# Patient Record
Sex: MALE | Race: WHITE
[De-identification: names, ages, dates, MRNs, and addresses within clinical notes are randomized per-mention and may not be internally consistent; named-entity substitution may affect disease eponyms.]

---

## 2020-06-01 ENCOUNTER — HOSPITAL ENCOUNTER (INPATIENT)
Dept: HOSPITAL 84 - D.M2 | Age: 75
LOS: 3 days | Discharge: HOME HEALTH | DRG: 300 | End: 2020-06-04
Attending: SURGERY | Admitting: SURGERY
Payer: MEDICARE

## 2020-06-01 VITALS — SYSTOLIC BLOOD PRESSURE: 145 MMHG | DIASTOLIC BLOOD PRESSURE: 86 MMHG

## 2020-06-01 VITALS — SYSTOLIC BLOOD PRESSURE: 150 MMHG | DIASTOLIC BLOOD PRESSURE: 77 MMHG

## 2020-06-01 VITALS — DIASTOLIC BLOOD PRESSURE: 69 MMHG | SYSTOLIC BLOOD PRESSURE: 127 MMHG

## 2020-06-01 DIAGNOSIS — I83.893: ICD-10-CM

## 2020-06-01 DIAGNOSIS — L03.116: ICD-10-CM

## 2020-06-01 DIAGNOSIS — I87.032: ICD-10-CM

## 2020-06-01 DIAGNOSIS — L03.115: ICD-10-CM

## 2020-06-01 DIAGNOSIS — I83.813: ICD-10-CM

## 2020-06-01 DIAGNOSIS — I87.021: Primary | ICD-10-CM

## 2020-06-01 LAB
ALBUMIN SERPL-MCNC: 3.6 G/DL (ref 3.4–5)
ALP SERPL-CCNC: 44 U/L (ref 30–120)
ALT SERPL-CCNC: 29 U/L (ref 10–68)
ANION GAP SERPL CALC-SCNC: 11.6 MMOL/L (ref 8–16)
BASOPHILS NFR BLD AUTO: 0.5 % (ref 0–2)
BILIRUB SERPL-MCNC: 0.5 MG/DL (ref 0.2–1.3)
BUN SERPL-MCNC: 26 MG/DL (ref 7–18)
CALCIUM SERPL-MCNC: 8.8 MG/DL (ref 8.5–10.1)
CHLORIDE SERPL-SCNC: 101 MMOL/L (ref 98–107)
CO2 SERPL-SCNC: 30.8 MMOL/L (ref 21–32)
CREAT SERPL-MCNC: 1.4 MG/DL (ref 0.6–1.3)
EOSINOPHIL NFR BLD: 2 % (ref 0–7)
ERYTHROCYTE [DISTWIDTH] IN BLOOD BY AUTOMATED COUNT: 13.1 % (ref 11.5–14.5)
GLOBULIN SER-MCNC: 3.8 G/L
GLUCOSE SERPL-MCNC: 97 MG/DL (ref 74–106)
HCT VFR BLD CALC: 42.9 % (ref 42–54)
HGB BLD-MCNC: 13.9 G/DL (ref 13.5–17.5)
IMM GRANULOCYTES NFR BLD: 0.3 % (ref 0–5)
LYMPHOCYTES NFR BLD AUTO: 13 % (ref 15–50)
MCH RBC QN AUTO: 31.4 PG (ref 26–34)
MCHC RBC AUTO-ENTMCNC: 32.4 G/DL (ref 31–37)
MCV RBC: 96.8 FL (ref 80–100)
MONOCYTES NFR BLD: 8.1 % (ref 2–11)
NEUTROPHILS NFR BLD AUTO: 76.1 % (ref 40–80)
OSMOLALITY SERPL CALC.SUM OF ELEC: 282 MOSM/KG (ref 275–300)
PLATELET # BLD: 240 10X3/UL (ref 130–400)
PMV BLD AUTO: 9.9 FL (ref 7.4–10.4)
POTASSIUM SERPL-SCNC: 4.4 MMOL/L (ref 3.5–5.1)
PROT SERPL-MCNC: 7.4 G/DL (ref 6.4–8.2)
RBC # BLD AUTO: 4.43 10X6/UL (ref 4.2–6.1)
SODIUM SERPL-SCNC: 139 MMOL/L (ref 136–145)
WBC # BLD AUTO: 7.6 10X3/UL (ref 4.8–10.8)

## 2020-06-01 NOTE — NUR
UNNA BOOTS IN PLACE ON BLE.
ORDERS RECEIVED  TO REMOVE ON WEDNESDAY AND APPLY NEW.
WOUND CARE CONTINUES TO MONITOR.

## 2020-06-01 NOTE — [facility]
CASE MANAGEMENT DISCHARGE SUMMARY
 
 
PATIENT: LAYLA LOPEZ                    UNIT: [id number]
ACCOUNT#: [id number]                       ADM DATE: 20
AGE: 75     : 41  SEX: M            ROOM/BED: D.2139    
AUTHOR: MARLON CONTRERAS                             PHYSICIAN:                               
 
REFERRING PHYSICIAN: HOSSEIN SANTOYO MD            
DATE OF SERVICE: 20
Discharge Plan
 
 
Patient Name: LAYLA LOPEZ
Facility: [facility]:[place]
Encounter #: [id number]
Medical Record #: [id number]
: 1945
Planned Disposition: Home with Home Health
Anticipated Discharge Date: 
 
Discharge Date: 
Expected LOS: 
Initial Reviewer: [id number]
Initial Review Date: 2020
Generated: 20   8:32 pm 
  
 
 
 
 
 
 
 
Patient Name: LAYLA LOPEZ
 
Encounter #: [id number]
Page [id number]
 
 
 
 
 
Electronically Signed by MARLON CONTRERAS on 20 at 1933
 
 
 
 
 
 
**All edits/amendments must be made on the electronic document**
 
DICTATION DATE: 20     : [place]  20     
RPT#: [id number]                                DC DATE:        
                                               STATUS: ADM IN  
[facility]
 [place] [address]
***END OF REPORT***

## 2020-06-02 VITALS — SYSTOLIC BLOOD PRESSURE: 167 MMHG | DIASTOLIC BLOOD PRESSURE: 87 MMHG

## 2020-06-02 VITALS — DIASTOLIC BLOOD PRESSURE: 55 MMHG | SYSTOLIC BLOOD PRESSURE: 135 MMHG

## 2020-06-02 VITALS — SYSTOLIC BLOOD PRESSURE: 128 MMHG | DIASTOLIC BLOOD PRESSURE: 69 MMHG

## 2020-06-02 VITALS — SYSTOLIC BLOOD PRESSURE: 140 MMHG | DIASTOLIC BLOOD PRESSURE: 80 MMHG

## 2020-06-02 VITALS — SYSTOLIC BLOOD PRESSURE: 114 MMHG | DIASTOLIC BLOOD PRESSURE: 66 MMHG

## 2020-06-02 VITALS — SYSTOLIC BLOOD PRESSURE: 159 MMHG | DIASTOLIC BLOOD PRESSURE: 83 MMHG

## 2020-06-02 LAB
BILIRUB SERPL-MCNC: NEGATIVE MG/DL
GLUCOSE SERPL-MCNC: NEGATIVE MG/DL
KETONES UR STRIP-MCNC: NEGATIVE MG/DL
NITRITE UR-MCNC: NEGATIVE MG/ML
PH UR STRIP: 7 [PH] (ref 5–6)
SP GR UR STRIP: 1.01 (ref 1–1.02)
UROBILINOGEN UR-MCNC: NORMAL MG/DL

## 2020-06-02 NOTE — [facility]
CASE MANAGEMENT DISCHARGE SUMMARY
 
 
PATIENT: LAYLA LOPEZ                    UNIT: [id number]
ACCOUNT#: [id number]                       ADM DATE: 20
AGE: 75     : 41  SEX: M            ROOM/BED: D.2139    
AUTHOR: MARLON CONTRERAS                             PHYSICIAN:                               
 
REFERRING PHYSICIAN: HOSSEIN SANTOYO MD            
DATE OF SERVICE: 20
Discharge Plan
 
 
Patient Name: LAYLA LOPEZ
Facility: [facility]:[place]
Encounter #: [id number]
Medical Record #: [id number]
: 1945
Planned Disposition: Home with Home Health
Anticipated Discharge Date: 
 
Discharge Date: 
Expected LOS: 
Initial Reviewer: [id number]
Initial Review Date: 2020
Generated: 20  11:20 am 
 DCPIA - Discharge Planning Initial Assessment
 
Updated by [id number]: Lili Tipton on 20  10:18 am
*  Is the patient Alert and Oriented?
Yes
*  How many steps to enter\exit or inside your home? 3/0 *  [facility] *  Pharmacy
[facility]
*  Preadmission Environment
Home Alone
*  ADLs
Independent
*  Equipment
None
*  List name and contact numbers for known caregivers / representatives who 
currently or will assist patient after discharge:
PT LIVES ALONE.  NO FAMILY IN STATE.
*  Verbal permission to speak to the caregivers and representatives has been 
obtained from the patient.
N/A
*  Community resources currently utilized
None
*  Additional services required to return to the preadmission environment?
Yes
*  Can the patient safely return to the preadmission environment?
 
Yes
*  Has this patient been hospitalized within the prior 30 days at any 
hospital?
No
 
 
 
 
 
 
 
Last DP export: 20   6:33 pm
Patient Name: LAYLA LOPEZ
Encounter #: [id number]
Page [id number]
 
 
 
 
 
Electronically Signed by MARLON CONTRERAS on 20 at 1020
 
 
 
 
 
 
**All edits/amendments must be made on the electronic document**
 
DICTATION DATE: 20 1020     : [place]  20 1020     
RPT#: [id number]                                DC DATE:        
                                               STATUS: ADM IN  
[facility]
[address] [place] [address]
***END OF REPORT***

## 2020-06-02 NOTE — [facility]
CASE MANAGEMENT DISCHARGE SUMMARY
 
 
PATIENT: LAYLA LOPEZ                    UNIT: [id number]
ACCOUNT#: [id number]                       ADM DATE: 20
AGE: 75     : 41  SEX: M            ROOM/BED: D.2139    
AUTHOR: MARLON CONTRERAS                             PHYSICIAN:                               
 
REFERRING PHYSICIAN: HOSSEIN SANTOYO MD            
DATE OF SERVICE: 20
Discharge Plan
 
 
Patient Name: LAYLA LOPEZ
[facility]
Encounter #: [id number]
Medical Record #: [id number]
: 1945
Planned Disposition: Home with Home Health
Anticipated Discharge Date: 
 
Discharge Date: 
Expected LOS: 
Initial Reviewer: [email or web address]
Initial Review Date: 2020
Generated: 20  11:30 am 
 DCPIA - Discharge Planning Initial Assessment
 
Updated by [email or web address]: Lili Tipton on 20  10:18 am
*  Is the patient Alert and Oriented?
Yes
*  How many steps to enter\exit or inside your home? 3/0 *  [facility] *  Pharmacy
[facility]
*  Preadmission Environment
Home Alone
*  ADLs
Independent
*  Equipment
None
*  List name and contact numbers for known caregivers / representatives who 
currently or will assist patient after discharge:
PT LIVES ALONE.  NO FAMILY IN STATE.
*  Verbal permission to speak to the caregivers and representatives has been 
obtained from the patient.
N/A
*  Community resources currently utilized
None
*  Additional services required to return to the preadmission environment?
Yes
*  Can the patient safely return to the preadmission environment?
 
Yes
*  Has this patient been hospitalized within the prior 30 days at any 
hospital?
No
 
External Providers
External Provider[facility]
 
Next Contact Date: 
Service Request Date: 
Service Type: 
Resolution: 
 
Reviewer: 
Comments: 
 
 
 
 
 
 
Last DP export: 20   9:20 am
Patient Name: LAYLA LOPEZ
Encounter #: [id number]
Page [id number]
 
 
 
 
 
Electronically Signed by MARLON CONTRERAS on 20 at 1030
 
 
 
 
 
 
**All edits/amendments must be made on the electronic document**
 
DICTATION DATE: 20 1030     : [place]  20 1030     
RPT#: [id number]                                DC DATE:        
                                               STATUS: ADM IN  
[facility]
[address] [place] [address]
***END OF REPORT***

## 2020-06-02 NOTE — NUR
CALLED  UPON REQUEST BY CASE MANAGMENT TO FIND OUT WHEN HE WAS
GOING TO COME SEE THE PATIENT AND HE SAID "IM IN THE FREAKING ROOM YOUNG
LADY".

## 2020-06-02 NOTE — NUR
SPOKE WITH  ABOUT STARTING HOME MEDS. HE STATED THAT HE FAXED OVER
ORDERS YESTERDAY THAT STATED TO RESTART HOME MEDS, BUT THEY DID NOT GET
RESTARTED. RESTARTING HOME MEDS AT THIS TIME PER . WILL CTM

## 2020-06-03 VITALS — DIASTOLIC BLOOD PRESSURE: 83 MMHG | SYSTOLIC BLOOD PRESSURE: 156 MMHG

## 2020-06-03 VITALS — DIASTOLIC BLOOD PRESSURE: 75 MMHG | SYSTOLIC BLOOD PRESSURE: 143 MMHG

## 2020-06-03 VITALS — SYSTOLIC BLOOD PRESSURE: 169 MMHG | DIASTOLIC BLOOD PRESSURE: 85 MMHG

## 2020-06-03 VITALS — DIASTOLIC BLOOD PRESSURE: 84 MMHG | SYSTOLIC BLOOD PRESSURE: 147 MMHG

## 2020-06-03 VITALS — DIASTOLIC BLOOD PRESSURE: 65 MMHG | SYSTOLIC BLOOD PRESSURE: 132 MMHG

## 2020-06-03 NOTE — NUR
PT RESTING IN BED WITH FEET ELEVATED ABOVE HEART PER ORDER. PT IS A/O X4. RR
EVEN AND UNLABORED. VITALS STABLE. PT DENIES ANY PAIN OR FURTHER NEEDS AT THIS
TIME. BED LOW CALL LIGHT WITHIN REACH. WILL CONTINUE TO MONITOR.

## 2020-06-03 NOTE — [facility]
CASE MANAGEMENT DISCHARGE SUMMARY
 
 
PATIENT: LAYLA LOPEZ                    UNIT: [id number]
ACCOUNT#: [id number]                       ADM DATE: 20
AGE: 75     : 41  SEX: M            ROOM/BED: D.3555    
AUTHOR: BEN,DOC                             PHYSICIAN:                               
 
REFERRING PHYSICIAN: HOSSEIN SANTOYO MD            
DATE OF SERVICE: 20
Discharge Plan
 
 
Patient Name: LAYLA LOPEZ
Facility: [facility]:[place]
Encounter #: [id number]
Medical Record #: [id number]
: 1945
Planned Disposition: Home with Home Health
Anticipated Discharge Date: 
 
Discharge Date: 
Expected LOS: 
Initial Reviewer: [id number]
Initial Review Date: 2020
Generated: 6/3/20  11:31 am 
Comments
 
DCP- Discharge Planning
 
Updated by [id number]: Lili Tipton on 6/3/20   9:30 am CT
Patient Name: LAYLA LOPEZ                                     
Admission Status: Elective   
Accout number: [id number]                              
Admission Date: 2020   
: 1945                                                        
Admission Diagnosis:CELLULITIS OF LEFT LOWER LIMB   
Attending: HOSSEIN SANTOYO                                                
Current LOS:  2   
  
Anticipated DC Date:    
Planned Disposition: Home with Home Health   
Primary Insurance: AET MEDICARE PPO or HMO   
  
******Late entry- assessment completed 20@1000  
  
Discharge Planning Comments: CM met with patient to complete initial dc 
planning assessment.  CM educated patient on the CM role and verbal consent 
given by patient to complete assessment.  CM verified patient's address, 
phone number, and emergency contact phone numbers.  Patient lives at home and 
 
states he is independent of his needs. Pt moved to [place] within the year 
and is renting a furnished apartment.  States the mattress is lumpy so he has 
been sleeping on the couch.  he does not feel that the landlord will furnish 
another mattress, and he is unable to afford one.  He also thinks that the 
landlord will not allow a hospital bed.   At discharge patient plans to 
return home and feels this is a safe discharge. States he has been 
independent and will remain that way.  CM discussed availability of home 
health, rehab services, and medical equipment. Patient denies rehab, but is 
in agreement with home health.  CM faxed referral to [facility] [phone]. 
Patient states he drove himself to the hospital, so he will drive himself 
home. CM will continue to follow and will assist as needed with dc 
plans/needs.    
  
: Lili POWELL,RN,CM  
  
6/3/2020 @ 1000  
  
CM spoke with Isaak Lopez (son) about DC plan and pt condition.  Isaak 
stated he will leave in the morning and drive from [place] in the hopes of 
taking his father home with him.  CM provided education about his wounds and 
the potential complication of being in a vehicle for 2 days.   Mino states 
he still plans on coming to [place] to assist patient with needs.  Isaak can 
be reached at [phone]. CM will continue to follow and will assist as 
needed with dc plans/needs.    
  
: Lili POWELL,RN,CM
 DCPIA - Discharge Planning Initial Assessment
 
Updated by [email or web address]: Lili Tipton on 20  10:18 am
*  Is the patient Alert and Oriented?
Yes
*  How many steps to enter\exit or inside your home? 3/0 *  [facility] *  Pharmacy
[facility]
*  Preadmission Environment
Home Alone
*  ADLs
Independent
*  Equipment
None
*  List name and contact numbers for known caregivers / representatives who 
currently or will assist patient after discharge:
PT LIVES ALONE.  NO FAMILY IN STATE.
*  Verbal permission to speak to the caregivers and representatives has been 
obtained from the patient.
N/A
*  Community resources currently utilized
None
*  Additional services required to return to the preadmission environment?
Yes
*  Can the patient safely return to the preadmission environment?
Yes
*  Has this patient been hospitalized within the prior 30 days at any 
 
hospital?
No
 
 
 
 
 
 
 
Last DP export: 20   9:30 am
Patient Name: LAYLA LOPEZ
Encounter #: [id number]
Page [id number]
 
 
 
 
 
Electronically Signed by MARLON CONTRERAS on 20 at 1032
 
 
 
 
 
 
**All edits/amendments must be made on the electronic document**
 
DICTATION DATE: 20 1031     : [place]  20 1031     
RPT#: [id number]                                DC DATE:        
                                               STATUS: ADM IN  
[facility]
[address] [place] [address]
***END OF REPORT***

## 2020-06-04 VITALS — DIASTOLIC BLOOD PRESSURE: 71 MMHG | SYSTOLIC BLOOD PRESSURE: 135 MMHG

## 2020-06-04 NOTE — [facility]
CASE MANAGEMENT DISCHARGE SUMMARY
 
 
PATIENT: LAYLA LOPEZ                    UNIT: [id number]
ACCOUNT#: [id number]                       ADM DATE: 20
AGE: 75     : 41  SEX: M            ROOM/BED: D.7315    
AUTHOR: BEN,DOC                             PHYSICIAN:                               
 
REFERRING PHYSICIAN: HOSSEIN SANTOYO MD            
DATE OF SERVICE: 20
Discharge Plan
 
 
Patient Name: LAYLA LOPEZ
Facility: [facility]:[place]
Encounter #: [id number]
Medical Record #: [id number]
: 1945
Planned Disposition: Home with Home Health
Anticipated Discharge Date: 
 
Discharge Date: 2020
Expected LOS: 
Initial Reviewer: [id number]
Initial Review Date: 2020
Generated: 20   8:29 pm 
Comments
 
DCP- Discharge Planning
 
Updated by [id number]: Lili Tipton on 6/3/20   9:30 am CT
Patient Name: LAYLA LOPEZ                                     
Admission Status: Elective   
Accout number: [id number]                              
Admission Date: 2020   
: 1945                                                        
Admission Diagnosis:CELLULITIS OF LEFT LOWER LIMB   
Attending: HOSSEIN SANTOYO                                                
Current LOS:  2   
  
Anticipated DC Date:    
Planned Disposition: Home with Home Health   
Primary Insurance: [employer] MEDICARE PPO or HMO   
  
******Late entry- assessment completed 20@1000  
  
Discharge Planning Comments: CM met with patient to complete initial dc 
planning assessment.  CM educated patient on the CM role and verbal consent 
given by patient to complete assessment.  CM verified patient's address, 
phone number, and emergency contact phone numbers.  Patient lives at home and 
 
states he is independent of his needs. Pt moved to [place] within the year 
and is renting a furnished apartment.  States the mattress is lumpy so he has 
been sleeping on the couch.  he does not feel that the landlord will furnish 
another mattress, and he is unable to afford one.  He also thinks that the 
landlord will not allow a hospital bed.   At discharge patient plans to 
return home and feels this is a safe discharge. States he has been 
independent and will remain that way.  CM discussed availability of home 
health, rehab services, and medical equipment. Patient denies rehab, but is 
in agreement with home health.  CM faxed referral to [facility] [phone]. 
Patient states he drove himself to the hospital, so he will drive himself 
home. CM will continue to follow and will assist as needed with dc 
plans/needs.    
  
: Lili Tipton MSN,RN,CM  
  
6/3/2020 @ 1000  
  
CM spoke with Isaak Lopez (son) about DC plan and pt condition.  Isaak 
stated he will leave in the morning and drive from [place] in the hopes of 
taking his father home with him.  CM provided education about his wounds and 
the potential complication of being in a vehicle for 2 days.   Mino states 
he still plans on coming to [place] to assist patient with needs.  Isaak can 
be reached at [phone]. CM will continue to follow and will assist as 
needed with dc plans/needs.    
  
: Lili POWELL,RN,LEVON
 DCPIA - Discharge Planning Initial Assessment
 
Updated by [id number]: Lili Tipton on 20  10:18 am
*  Is the patient Alert and Oriented?
Yes
*  How many steps to enter\exit or inside your home? 3/0 *  [facility] *  Pharmacy
[facility]
*  Preadmission Environment
Home Alone
*  ADLs
Independent
*  Equipment
None
*  List name and contact numbers for known caregivers / representatives who 
currently or will assist patient after discharge:
PT LIVES ALONE.  NO FAMILY IN STATE.
*  Verbal permission to speak to the caregivers and representatives has been 
obtained from the patient.
N/A
*  Community resources currently utilized
None
*  Additional services required to return to the preadmission environment?
Yes
*  Can the patient safely return to the preadmission environment?
Yes
*  Has this patient been hospitalized within the prior 30 days at any 
 
hospital?
No
 
 
 
 
 
 
 
Last DP export: 6/3/20   9:32 am
Patient Name: LAYLA LOPEZ
Encounter #: [id number]
Page [phone]
 
 
 
 
 
Electronically Signed by MARLON CONTRERAS on 20 at 192
 
 
 
 
 
 
**All edits/amendments must be made on the electronic document**
 
DICTATION DATE: 20     : [place]  20     
RPT#: [id number]                                DC DATE:20
                                               STATUS: DIS IN  
[address] [place] [address]
***END OF REPORT***

## 2020-06-04 NOTE — NUR
PT'S DISCHARGE INSTRUCTIONS REVIEWED AND SIGNED.  ASSIST TO DRESS AND WHEELED
OUT TO CAR TO DRIVE SELF HOME.

## 2020-09-10 NOTE — NUR
BILATERAL UNNA BOOTS REMOVED.  THE LEFT LOWER LEG HAS 2 LARGE SUPERFICIAL
ULCERS WHICH BLEED EASILY.  RIGHT LOWER LEG HAS 1 OPEN ULCER.
LEGS WERE CLEANSED WITH WOUND  AND PATTED DRY. CULTURES WERE OBTAINED
FROM LEFT LEG WOUNDS AND SENT TO LAB.
NEW UNNA BOOTS WERE APPLIED BILATERALLY.
INSTRUCTED PT THAT DR. SANTOYO HAS ORDERED THEY BE CHANGED TWICE/WEEK AND
WHEN HE IS DISCHARGED, HOME HEALTH WILL BE DOING THE DRESSING CHANGES.
HE VOICED HIS UNDERSTANDING.
FOOT OF BED IS ELEVATED AND LEGS ARE RESTING ON TOP OF 2 PILLOWS FOR MORE
ELEVATION.
WOUND CARE CONTINUES TO MONITOR. [place] Counseling:  I discussed with the patient the risks of golimumab including but not limited to myelosuppression, immunosuppression, autoimmune hepatitis, demyelinating diseases, lymphoma, and serious infections.  The patient understands that monitoring is required including a PPD at baseline and must alert us or the primary physician if symptoms of infection or other concerning signs are noted.